# Patient Record
Sex: FEMALE | Race: WHITE | NOT HISPANIC OR LATINO | Employment: OTHER | URBAN - METROPOLITAN AREA
[De-identification: names, ages, dates, MRNs, and addresses within clinical notes are randomized per-mention and may not be internally consistent; named-entity substitution may affect disease eponyms.]

---
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
## 2023-06-29 NOTE — ASSESSMENT & PLAN NOTE
See GI hemorrhage-feeding improving   Bi-Rhombic Flap Text: The defect edges were debeveled with a #15 scalpel blade.  Given the location of the defect and the proximity to free margins a bi-rhombic flap was deemed most appropriate.  Using a sterile surgical marker, an appropriate rhombic flap was drawn incorporating the defect. The area thus outlined was incised deep to adipose tissue with a #15 scalpel blade.  The skin margins were undermined to an appropriate distance in all directions utilizing iris scissors.

## 2023-12-25 NOTE — ASSESSMENT & PLAN NOTE
Candis Manley is a 13 y.o. female with gall stones and intolerance of enteral feeding, possibly 2/2 biliary colic    Continue IVF and NPO  Would recommend more aggressive pain control when patient having agitation/pain  Plan for lap kasey on 12/31/18 to rule out gallbladder etiology as source of abdominal pain  Continue npo.   Consent to be obtained in the morning   Refill Decision Note   Deepthi Jamison  is requesting a refill authorization.  Brief Assessment and Rationale for Refill:  Approve     Medication Therapy Plan:         Comments:     Note composed:2:44 AM 12/25/2023

## 2024-04-10 NOTE — CONSULTS
REASON FOR CONSULT:  Respiratory distress    PROBLEM LIST:  UAO  Scoliosis   Episodic increase WOB (possible asthma)  Orbelli syndrome    MEDICINES:  Albuterol  Budesonide  methylprednisone  Pantoprazole      HISTORY OF PRESENT ILLNESS:   4 days prior to admit noted to have increase WOB.  No preceding illness ASHLEY and OCS rescue started.  Blood from gtube prompted ER visit and admitted for possible GI bleed. Irritable.  Increase WOB continue.    REVIEW OF SYSTEMS:     Review of Systems   Constitutional: Positive for activity change. Negative for fever.   HENT: Negative for rhinorrhea.    Eyes: Negative for itching.   Respiratory: Negative for cough, choking, shortness of breath and wheezing.    Cardiovascular: Negative for leg swelling.   Gastrointestinal: Negative for diarrhea and vomiting.   Genitourinary: Negative for decreased urine volume and dysuria.   Musculoskeletal: Negative for joint swelling.   Skin: Negative for rash.   Neurological: Negative for seizures.   Psychiatric/Behavioral: Negative for sleep disturbance.       PHYSICAL EXAM:      Physical Exam   Constitutional: She appears distressed (irritable).   HENT:   Head: Normocephalic.   Mouth/Throat: Oropharynx is clear and moist.   Facial abnormality     Eyes: Conjunctivae and EOM are normal. Pupils are equal, round, and reactive to light.   Neck: Normal range of motion.   Cardiovascular: Normal rate and normal heart sounds.    Pulmonary/Chest: Accessory muscle usage present. Tachypnea noted. She is in respiratory distress (prolonged exhalation). She has decreased breath sounds in the left upper field. She has no wheezes. She exhibits deformity (scoliosis).   Abdominal: Soft.   Musculoskeletal: She exhibits deformity.   Neurological: She is alert. She exhibits abnormal muscle tone. Coordination abnormal.   Skin: Skin is warm.   Nursing note and vitals reviewed.      EPIC notes, labs, and imaging reviewed  CXR- significant scoliosis, lung fields  Call your physician immediately if you notice any of the following symptoms of a blood clot:   Sudden weakness in any limb  Numbness or tingling anywhere  Visual changes or loss of sight in either eye  Sudden onset of slurred speech or inability to speak  Dizziness or faintness  New pain, swelling, redness or heat in any extremity  New shortness of breath or chest pain     appear clear  ASSESSMENT:   Respiratory distress- likely multifactorial  Suspect component of aspiration  RTI can not be excluded  Risk for respiratory decompensation (with minimal insult) high secondary to significant restrictive disease      RECOMMENDATIONS:   CBG  Low threshold to start abx  Monitor

## (undated) DEVICE — KIT ANTIFOG

## (undated) DEVICE — ELECTRODE NEEDLE 2.8IN

## (undated) DEVICE — SCISSOR 5MMX35CM DIRECT DRIVE

## (undated) DEVICE — TRAY MINOR GEN SURG

## (undated) DEVICE — CONTAINER SPECIMEN STRL 4OZ

## (undated) DEVICE — SUT MONOCRYL 4-0 PS-2

## (undated) DEVICE — BLADE SURG CARBON STEEL SZ11

## (undated) DEVICE — SEE MEDLINE ITEM 157117

## (undated) DEVICE — TUBING HF INSUFFLATION W/ FLTR

## (undated) DEVICE — STRIP STERI REIN CLSR 1/2X2IN

## (undated) DEVICE — SUT MONOCRYL 5-0 P-3 UND 18

## (undated) DEVICE — DRAPE ABDOMINAL TIBURON 14X11

## (undated) DEVICE — NDL INSUF ULTRA VERESS 120MM

## (undated) DEVICE — SOL NS 1000CC

## (undated) DEVICE — SUT 0 VICRYL / UR6 (J603)

## (undated) DEVICE — APPLIER CLIP ENDO LIGAMAX 5MM